# Patient Record
Sex: FEMALE | Race: WHITE | NOT HISPANIC OR LATINO | URBAN - METROPOLITAN AREA
[De-identification: names, ages, dates, MRNs, and addresses within clinical notes are randomized per-mention and may not be internally consistent; named-entity substitution may affect disease eponyms.]

---

## 2023-10-21 ENCOUNTER — APPOINTMENT (OUTPATIENT)
Dept: LAB | Facility: HOSPITAL | Age: 18
End: 2023-10-21
Payer: COMMERCIAL

## 2023-10-21 DIAGNOSIS — Z13.0 SCREENING FOR IRON DEFICIENCY ANEMIA: ICD-10-CM

## 2023-10-21 PROCEDURE — 85660 RBC SICKLE CELL TEST: CPT

## 2023-10-21 PROCEDURE — 36415 COLL VENOUS BLD VENIPUNCTURE: CPT

## 2023-10-25 LAB — SICKLE CELLS BLD QL SMEAR: NEGATIVE

## 2024-05-17 ENCOUNTER — APPOINTMENT (OUTPATIENT)
Dept: LAB | Facility: CLINIC | Age: 19
End: 2024-05-17

## 2024-05-17 ENCOUNTER — OCCMED (OUTPATIENT)
Dept: URGENT CARE | Facility: CLINIC | Age: 19
End: 2024-05-17

## 2024-05-17 DIAGNOSIS — Z02.1 PRE-EMPLOYMENT HEALTH SCREENING EXAMINATION: ICD-10-CM

## 2024-05-17 DIAGNOSIS — Z02.1 PRE-EMPLOYMENT HEALTH SCREENING EXAMINATION: Primary | ICD-10-CM

## 2024-05-17 LAB — RUBV IGG SERPL IA-ACNC: <10 IU/ML

## 2024-05-17 PROCEDURE — 86480 TB TEST CELL IMMUN MEASURE: CPT

## 2024-05-17 PROCEDURE — 36415 COLL VENOUS BLD VENIPUNCTURE: CPT

## 2024-05-17 PROCEDURE — 86765 RUBEOLA ANTIBODY: CPT

## 2024-05-17 PROCEDURE — 86762 RUBELLA ANTIBODY: CPT

## 2024-05-17 PROCEDURE — 86735 MUMPS ANTIBODY: CPT

## 2024-05-17 PROCEDURE — 86787 VARICELLA-ZOSTER ANTIBODY: CPT

## 2024-05-18 LAB
GAMMA INTERFERON BACKGROUND BLD IA-ACNC: 1.6 IU/ML
M TB IFN-G BLD-IMP: NEGATIVE
M TB IFN-G CD4+ BCKGRND COR BLD-ACNC: -0.01 IU/ML
M TB IFN-G CD4+ BCKGRND COR BLD-ACNC: 0.08 IU/ML
MEV IGG SER QL IA: ABNORMAL
MITOGEN IGNF BCKGRD COR BLD-ACNC: 5.45 IU/ML
MUV IGG SER QL IA: ABNORMAL
VZV IGG SER QL IA: ABNORMAL

## 2024-11-05 ENCOUNTER — OCCMED (OUTPATIENT)
Dept: URGENT CARE | Facility: CLINIC | Age: 19
End: 2024-11-05

## 2024-11-05 ENCOUNTER — APPOINTMENT (OUTPATIENT)
Dept: LAB | Facility: CLINIC | Age: 19
End: 2024-11-05

## 2024-11-05 DIAGNOSIS — Z02.1 PRE-EMPLOYMENT HEALTH SCREENING EXAMINATION: ICD-10-CM

## 2024-11-05 DIAGNOSIS — Z02.1 PRE-EMPLOYMENT HEALTH SCREENING EXAMINATION: Primary | ICD-10-CM

## 2024-11-05 PROCEDURE — 86480 TB TEST CELL IMMUN MEASURE: CPT

## 2024-11-05 PROCEDURE — 36415 COLL VENOUS BLD VENIPUNCTURE: CPT

## 2024-11-06 LAB
GAMMA INTERFERON BACKGROUND BLD IA-ACNC: <0 IU/ML
M TB IFN-G BLD-IMP: NEGATIVE
M TB IFN-G CD4+ BCKGRND COR BLD-ACNC: 0 IU/ML
M TB IFN-G CD4+ BCKGRND COR BLD-ACNC: 0 IU/ML
MITOGEN IGNF BCKGRD COR BLD-ACNC: 10 IU/ML

## 2025-02-17 ENCOUNTER — HOSPITAL ENCOUNTER (EMERGENCY)
Facility: HOSPITAL | Age: 20
Discharge: HOME | End: 2025-02-17
Attending: EMERGENCY MEDICINE
Payer: COMMERCIAL

## 2025-02-17 VITALS
TEMPERATURE: 98.1 F | OXYGEN SATURATION: 100 % | HEART RATE: 84 BPM | WEIGHT: 137.13 LBS | DIASTOLIC BLOOD PRESSURE: 90 MMHG | RESPIRATION RATE: 18 BRPM | SYSTOLIC BLOOD PRESSURE: 140 MMHG

## 2025-02-17 DIAGNOSIS — S06.0XAA CONCUSSION: Primary | ICD-10-CM

## 2025-02-17 DIAGNOSIS — S09.90XA INJURY OF HEAD, INITIAL ENCOUNTER: ICD-10-CM

## 2025-02-17 PROCEDURE — 99283 EMERGENCY DEPT VISIT LOW MDM: CPT

## 2025-02-17 PROCEDURE — 99284 EMERGENCY DEPT VISIT MOD MDM: CPT

## 2025-02-17 RX ORDER — ACETAMINOPHEN 500 MG
500 TABLET ORAL EVERY 6 HOURS PRN
Qty: 30 TABLET | Refills: 0 | Status: SHIPPED | OUTPATIENT
Start: 2025-02-17

## 2025-02-17 RX ORDER — ONDANSETRON 4 MG/1
4 TABLET, ORALLY DISINTEGRATING ORAL EVERY 6 HOURS PRN
Qty: 20 TABLET | Refills: 0 | Status: SHIPPED | OUTPATIENT
Start: 2025-02-17

## 2025-02-17 RX ORDER — IBUPROFEN 600 MG/1
600 TABLET, FILM COATED ORAL EVERY 6 HOURS PRN
Qty: 30 TABLET | Refills: 0 | Status: SHIPPED | OUTPATIENT
Start: 2025-02-17
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
## 2025-02-17 NOTE — Clinical Note
Ilsa Nuno was seen and treated in our emergency department on 2/17/2025.                Diagnosis:     Ilsa  may return to work on return date.    She may return on this date: 02/24/2025         If you have any questions or concerns, please don't hesitate to call.      Sergio Fraire PA-C    ______________________________           _______________          _______________  Hospital Representative                              Date                                Time

## 2025-02-17 NOTE — Clinical Note
Ilsa Nuno was seen and treated in our emergency department on 2/17/2025.                Diagnosis:     Ilsa  .    She may return on this date:     Please allow Ilsa to take breaks when needed. Should be resting as much as possible      If you have any questions or concerns, please don't hesitate to call.      Sergio Fraire PA-C    ______________________________           _______________          _______________  Hospital Representative                              Date                                Time

## 2025-02-17 NOTE — DISCHARGE INSTRUCTIONS
-I reccomend you take 600mg ibuprofen every 6 hours or tylenol 650mg every 6 hours as needed. If needed, you can alternate these medications so that you take one medication every 3 hours. For instance, at noon take ibuprofen, then at 3pm take tylenol, then at 6pm take ibuprofen.   -take zofran as needed for nausea and vomiting  -increase fluid intake  -rest your brain!

## 2025-02-21 ENCOUNTER — OFFICE VISIT (OUTPATIENT)
Dept: OBGYN CLINIC | Facility: OTHER | Age: 20
End: 2025-02-21
Payer: COMMERCIAL

## 2025-02-21 VITALS — BODY MASS INDEX: 21.41 KG/M2 | WEIGHT: 136.4 LBS | HEIGHT: 67 IN

## 2025-02-21 DIAGNOSIS — G44.309 POST-TRAUMATIC HEADACHE, NOT INTRACTABLE, UNSPECIFIED CHRONICITY PATTERN: ICD-10-CM

## 2025-02-21 DIAGNOSIS — S06.0XAA CONCUSSION: Primary | ICD-10-CM

## 2025-02-21 PROCEDURE — 99204 OFFICE O/P NEW MOD 45 MIN: CPT | Performed by: FAMILY MEDICINE

## 2025-02-21 RX ORDER — METHYLPREDNISOLONE 4 MG/1
TABLET ORAL
Qty: 21 TABLET | Refills: 0 | Status: SHIPPED | OUTPATIENT
Start: 2025-02-21
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
## 2025-02-21 NOTE — LETTER
Academic / Physical School Note &/or Note to Certified Athletic Trainer    February 21, 2025    Patient: Ilsa Nuno  YOB: 2005  Age:  19 y.o.  Date of visit: 2/21/2025    The above patient was seen in our office recently.  Due to a concussion we recommend:    Allow for extra time for test and assignment completion  Excuse from testing if symptoms worsen  Allow paper based assignments if unable to tolerate computer screen assignments    The following instructions that are checked apply for this patient:   No physical activity   x Light aerobic, non-contact activity (with no symptoms)    May progress through RTP up to step 4.  Please see table below.      Graded concussion Return to Play protocol.  Please see table below.       1)  No physical activity    2)  Light aerobic activity (walking, swimming, stationary bike)    3)  Sport-specific activity (non-contact)    4)  Non-contact training drill and resistance training    5)  Full contact practice    6)  Normal game     ** If symptoms occur at any level, drop back to prior level.  **    Please perform IMPACT test on:      Patient to return to our office:    2 weeks    Patient fully understands and verbally agrees with the above mentioned instructions.    Please contact our office with any questions at:  [phone]     Sincerely,    Fuastino Gramajo III, DO    No Recipients

## 2025-06-12 ENCOUNTER — TELEPHONE (OUTPATIENT)
Age: 20
End: 2025-06-12

## 2025-06-12 NOTE — TELEPHONE ENCOUNTER
Caller: Patient     Doctor: Dr. Gramajo    Reason for call: Patient called the office asking to speak with Tea. Stated she received a call from Tea that Dr. Gramajo had to leave and her appt will need to be r/s. Patient is asking if there is someone else that can see her. She is requesting an updated letter with accommodations. Please advise.     Call back#: [phone]

## 2025-06-12 NOTE — TELEPHONE ENCOUNTER
Sin Guzman, I believe Dr. Gramajo said you would be helping this patient if they were unable to see him today?  I hope that makes sense.

## 2025-06-23 ENCOUNTER — TELEPHONE (OUTPATIENT)
Age: 20
End: 2025-06-23

## 2025-06-24 ENCOUNTER — OFFICE VISIT (OUTPATIENT)
Dept: OBGYN CLINIC | Facility: CLINIC | Age: 20
End: 2025-06-24
Payer: COMMERCIAL

## 2025-06-24 VITALS — HEIGHT: 67 IN | WEIGHT: 136 LBS | BODY MASS INDEX: 21.35 KG/M2

## 2025-06-24 DIAGNOSIS — S06.0X0D CONCUSSION WITHOUT LOSS OF CONSCIOUSNESS, SUBSEQUENT ENCOUNTER: Primary | ICD-10-CM

## 2025-06-24 DIAGNOSIS — G44.309 POST-TRAUMATIC HEADACHE, NOT INTRACTABLE, UNSPECIFIED CHRONICITY PATTERN: ICD-10-CM

## 2025-06-24 PROCEDURE — 99214 OFFICE O/P EST MOD 30 MIN: CPT | Performed by: FAMILY MEDICINE

## 2025-06-24 NOTE — PROGRESS NOTES
Assessment:      Diagnosis ICD-10-CM Associated Orders   1. Concussion without loss of consciousness, subsequent encounter  S06.0X0D Ambulatory Referral to Neurology     Ambulatory Referral to Occupational Therapy     Ambulatory Referral to Speech Therapy      2. Post-traumatic headache, not intractable, unspecified chronicity pattern  G44.309 Ambulatory Referral to Neurology     Ambulatory Referral to Occupational Therapy     Ambulatory Referral to Speech Therapy          Plan:  Assessment & Plan  Concussion without loss of consciousness, subsequent encounter    Orders:    Ambulatory Referral to Neurology; Future    Ambulatory Referral to Occupational Therapy; Future    Ambulatory Referral to Speech Therapy; Future    Post-traumatic headache, not intractable, unspecified chronicity pattern    Orders:    Ambulatory Referral to Neurology; Future    Ambulatory Referral to Occupational Therapy; Future    Ambulatory Referral to Speech Therapy; Future       I explained my current clinical findings to Ilsa Nuno   and accompanying parent/caregiver. We had a detailed discussion with regards to pathophysiology of a concussion injury along with its immediate, short-term and long-term complications.    1. Physical activity - May initiate light aerobic activity       2. Cognitive / academic activity - No Visual / Auditory / Workload / Testing Accommodations were provided today.    Patient previously had school accommodations back in February.  Patient was looking to have the school accommodations continued.  Initial concussion accommodations are 1 to 4 weeks and typically do not continue long-term.  Due to patient looking for prolonged academic accommodations will have patient follow-up with PCP.  Also recommended patient follow-up with speech pathology for further evaluation as well as neurology.      3. Symptomatic treatment -  Reviewed hydration, caffeine intake, sleep hygiene      4. Other management - Not indicated at  this time.         5. Referrals made -referral made to occupational therapy due to continued headaches with slight change in smooth ocular pursuits.  Referral also placed to neurology due to continued headaches.     6. Reviewed Emergency Department documentation/ [employer] / School Athletic [employer] Documentation completed on 02/17/2025 and primary care sports medicine documentation completed on 02/21/2025           Follow-Up:    Return for Follow up neurology .    Time spent greater than 30 minutes for pre-charting, encounter, and post-charting.       ImPACT Neurocognitive Test Interpretation:   No impact testing to date          Chief Complaint: Concussion   HPI:       Patient ID:  Ilsa Nuno is a 19 y.o. female    School: [employer]  Related to: Slip and fall on ice  School Status: Initially had school accommodations back in February 2025.    Injury Description:  Date / Time: 02/15/2025  :  Patient  Injury Description: Slip and fall  Location:  Occipital    Amnesia:    Retrograde:  no    Anterograde:  no    LOC:  no  Seizures:  No  CT Scan:  No   History of Concussion:   How many?  2 and Last concussion?  2019    Headache History:    Location:   Frontal, Timing:   Intermittent, Current symptom duration:   3-4 hours, Exacerbating factors:   Cognitive activity, Relieving factors:   Rest and Tylenol  Family History of Headache:  No  Developmental History:  Denies  History of Sleep Disorder:  No  Psychiatric History:  Anxiety recently started on a SSRI    Do symptoms worsen with Physical Activity?  Yes, no worsening of symptoms  Do symptoms worsen with Cognitive Activity?  N/A  Overall Rating:  What percent is this person back to normal?  Patient 70 %  Continues to have headaches.  Patient states headaches are at least 3 times per week.  Frontal region.  Last 3 to 4 hours.  Aggravated by typical ADLs.  Alleviated by Tylenol and resting.  Will feel slightly lightheaded prior to the headaches.  Recently  did have 1 episode of headache where she had visual aura and nausea/1 episode of vomiting    The following portions of the patient's history were reviewed and updated as appropriate: allergies, current medications, past family history, past medical history, past social history, past surgical history, and problem list.    Does patient have history of mood disorder or report significant mood associated symptoms? N/A    The current concussion symptom score is 0/22    See below for symptoms in the last 24 hours.     PHQ SCREENING     PHQ-A Screening                   CONCUSSION SYMPTOMS     Symptoms Checklist      Flowsheet Row Most Recent Value   Physical    Headache 1   Nausea 0   Vomiting 0   Balance problems 0   Dizziness 1   Visual problems 0   Fatigue 1   Sensitivity to light 0   Sensitivity to noise 0   Numbness / tingling 0   TOTAL PHYSICAL SCORE 3   Cognitive    Foggy 1   Slowed down 1   Difficulty concentrating 1   Difficulty remembering 0   TOTAL COGNITIVE SCORE 3   Emotional    Irritability 0   Sadness 0   More emotional 0   Nervousness 1   TOTAL EMOTIONAL SCORE 1   Sleep    Drowsiness 1   Sleeping less 1   Sleeping more 0   Difficulty falling asleep 0   TOTAL SLEEP SCORE 2   TOTAL SYMPTOM SCORE 9            Imaging     No imaging to review     Problem List[1]     Medications Ordered Prior to Encounter[2]     No Known Allergies     Tobacco Use: Low Risk  (6/24/2025)    Patient History     Smoking Tobacco Use: Never     Smokeless Tobacco Use: Never     Passive Exposure: Not on file        Social Drivers of Health     Tobacco Use: Low Risk  (6/24/2025)    Patient History     Smoking Tobacco Use: Never     Smokeless Tobacco Use: Never     Passive Exposure: Not on file   Alcohol Use: Not on file   Financial Resource Strain: Not At Risk (5/28/2025)    Received from [facility]    Financial Insecurity     In the last 12 months did you skip medications to save money?: No     In the last 12 months  "was there a time when you needed to see a doctor but could not because of cost?: No   Food Insecurity: No Food Insecurity (5/28/2025)    Received from [facility]    Food Insecurity     In the last 12 months did you ever eat less than you felt you should because there wasn't enough money for food?: No   Transportation Needs: No Transportation Needs (5/28/2025)    Received from [facility]    Transportation Needs     In the last 12 months have you ever had to go without healthcare because you didn't have a way to get there?: No   Physical Activity: Not on file   Stress: Not on file   Social Connections: Socially Integrated (5/28/2025)    Received from [facility]    Social Connection     Do you often feel lonely?: No   Intimate Partner Violence: Not on file   Depression: Not at risk (10/30/2024)    Received from [facility]    PHQ-2     PHQ-2 Score: 0   Housing Stability: Not At Risk (5/28/2025)    Received from [facility]    Housing Stability     Are you worried that in the next 2 months you may not have stable housing?: No   Utilities: Not At Risk (5/28/2025)    Received from [facility]    Utilities Insecurity     In the last 12 months has the electric, gas, oil, or water company threatened to shut off services in your home?: No   Health Literacy: Not on file        Review of Systems     Review of Systems     All other systems negative.    Physical Exam   Body mass index is 21.3 kg/m².     Physical Exam          Ht 5' 7\" (1.702 m)   Wt 61.7 kg (136 lb)   BMI 21.30 kg/m²     General:   NAD:  Yes  MSK:   Cervical Spine mid-line tenderness: No   Paraspinal tenderness: No   Cervical range of motion: intact   Tinel's positive over Right / Left / Bilateral greater occipital nerve: No  B/L UE strength testing: intact and equal  B/L LE strength testing: intact and equal   Psych:   AAOX3:  Yes   Mood and Affect:  " "Normal  HEENT:   Lacerations:  No   Bruising:  No   PEERLA:  Yes   Ocular Corrective wear: denies  Neuro:   Examination of Coordination:  Normal   CNII - XII Intact:  Yes   FTN:  Normal   Rodriguez's sign: negative b/l    Ankle Clonus: negative b/l    Patellar reflexes: present and equal bilateral    Accommodation:  less than 6-8 cm    Convergence:  less than 6-8 cm  Vestibular Ocular:    Gaze stability:  No nystagmus or dizziness with vertical or horizontal eye motion, there is slight change in smooth pursuit     Modified Balance Error Scoring System (M-JESSICA) 10 seconds each.  Tandem stance:  Eyes Open 0 error(s). Eyes Closed minimal error(s).   Single leg stance: Eyes Open 0 error(s). Eyes Closed minimal error(s).        Portions of the record may have been created with voice recognition software. Occasional wrong word or \"sound alike\" substitutions may have occurred due to the inherent limitations of voice recognition software. Please review the chart carefully and recognize, using context, where substitutions/typographical errors may have occurred.          [1] There is no problem list on file for this patient.   [2]   Current Outpatient Medications on File Prior to Visit   Medication Sig Dispense Refill    acetaminophen (TYLENOL) 500 mg tablet Take 1 tablet (500 mg total) by mouth every 6 (six) hours as needed for mild pain 30 tablet 0    ibuprofen (MOTRIN) 600 mg tablet Take 1 tablet (600 mg total) by mouth every 6 (six) hours as needed for mild pain 30 tablet 0    methylPREDNISolone 4 MG tablet therapy pack Use as directed on package 21 tablet 0    ondansetron (ZOFRAN-ODT) 4 mg disintegrating tablet Take 1 tablet (4 mg total) by mouth every 6 (six) hours as needed for vomiting or nausea 20 tablet 0     No current facility-administered medications on file prior to visit.     "